# Patient Record
Sex: FEMALE | Race: WHITE | Employment: FULL TIME
[De-identification: names, ages, dates, MRNs, and addresses within clinical notes are randomized per-mention and may not be internally consistent; named-entity substitution may affect disease eponyms.]

---

## 2017-07-08 ENCOUNTER — HEALTH MAINTENANCE LETTER (OUTPATIENT)
Age: 32
End: 2017-07-08

## 2018-07-15 ENCOUNTER — HEALTH MAINTENANCE LETTER (OUTPATIENT)
Age: 33
End: 2018-07-15

## 2019-07-21 ENCOUNTER — NURSE TRIAGE (OUTPATIENT)
Dept: NURSING | Facility: CLINIC | Age: 34
End: 2019-07-21

## 2019-07-21 ENCOUNTER — HOSPITAL ENCOUNTER (EMERGENCY)
Facility: CLINIC | Age: 34
Discharge: HOME | End: 2019-07-21
Attending: EMERGENCY MEDICINE | Admitting: EMERGENCY MEDICINE
Payer: COMMERCIAL

## 2019-07-21 VITALS
HEIGHT: 68 IN | DIASTOLIC BLOOD PRESSURE: 71 MMHG | WEIGHT: 125 LBS | TEMPERATURE: 98.2 F | BODY MASS INDEX: 18.94 KG/M2 | RESPIRATION RATE: 16 BRPM | OXYGEN SATURATION: 100 % | SYSTOLIC BLOOD PRESSURE: 117 MMHG

## 2019-07-21 DIAGNOSIS — K59.00 CONSTIPATION, UNSPECIFIED CONSTIPATION TYPE: ICD-10-CM

## 2019-07-21 PROCEDURE — 99283 EMERGENCY DEPT VISIT LOW MDM: CPT | Performed by: EMERGENCY MEDICINE

## 2019-07-21 PROCEDURE — 99284 EMERGENCY DEPT VISIT MOD MDM: CPT | Mod: Z6 | Performed by: EMERGENCY MEDICINE

## 2019-07-21 PROCEDURE — 25000132 ZZH RX MED GY IP 250 OP 250 PS 637: Performed by: EMERGENCY MEDICINE

## 2019-07-21 RX ORDER — MAGNESIUM CARB/ALUMINUM HYDROX 105-160MG
296 TABLET,CHEWABLE ORAL ONCE
Status: COMPLETED | OUTPATIENT
Start: 2019-07-21 | End: 2019-07-21

## 2019-07-21 RX ADMIN — MAGNESIUM CITRATE 296 ML: 1.75 LIQUID ORAL at 16:49

## 2019-07-21 ASSESSMENT — ENCOUNTER SYMPTOMS
BLOOD IN STOOL: 0
CONSTITUTIONAL NEGATIVE: 1
ANAL BLEEDING: 0
CONSTIPATION: 1
NAUSEA: 1
ABDOMINAL PAIN: 1
VOMITING: 0

## 2019-07-21 ASSESSMENT — MIFFLIN-ST. JEOR: SCORE: 1310.5

## 2019-07-21 NOTE — ED PROVIDER NOTES
History     Chief Complaint   Patient presents with     Constipation     9 days     HPI  Ritika Soler is a 33 year old female with hx of constipation with presentation for constipation refractory to enemas x 2, miralax, flax seed and herbal remedy her mother gave her. Last BM was 9 days ago. Feels like food is floating up in her chest and won't go down. Decreased appetite and nausea. No vomiting. Mild abd tenderness only.    Allergies:  No Known Allergies    Problem List:    Patient Active Problem List    Diagnosis Date Noted     UTI (urinary tract infection) 02/10/2014     Priority: Medium     ASCUS, + HPV, LEEP 02/16/2009     Priority: Medium     1/09:Atypical endocervical cells on pap at [facility] in [place].  2/16/09:Colpo--B9. Repeat pap in 6 months.  2/16/2010:pap--NIL. Repeat pap 1 year.  5/5/11: Pap - ASCUS, + HPV 53. Plan colp.  6/9/11:[place]--TERESE 1-2. Plan LEEP.   6/22/11:LEEP--Mild and moderate dysplasia, possible extension to margin. Repeat pap 6 months. Reminder in epic.   1/24/12:Pap--NIL. Repeat pap in 6 months. Reminder sent.  **Annual pap screening indicated due to possible +Margins on LEEP**  1/16/13: Patient failed follow-up. Pap tracking file closed.   2/6/14: NIL pap, neg HPV. Plan cotest pap & HPV in 1 year.  3/2/16:Pap--NIL, neg HPV. Continue annual pap screening (given possible +Margins on previous LEEP) -- unless otherwise directed by Provider.              Past Medical History:    Past Medical History:   Diagnosis Date     History of colposcopy with cervical biopsy 6/9/11     S/P LEEP of cervix 2011       Past Surgical History:    Past Surgical History:   Procedure Laterality Date     COLPOSCOPY CERVIX, LOOP ELECTRODE BIOPSY, COMBINED  6/22/11    mild and moderate dysplasia       Family History:    Family History   Problem Relation Age of Onset     Cancer Maternal Grandmother      Heart Disease Paternal Grandfather      Cancer Paternal Grandfather         stomach cancer      "Asthma Brother      Cancer Mother         skin cancer     Cancer Maternal Grandfather        Social History:  Marital Status:  Single [1]  Social History     Tobacco Use     Smoking status: Never Smoker     Smokeless tobacco: Never Used   Substance Use Topics     Alcohol use: Yes     Comment: Avg. 3 drinks per week     Drug use: No        Medications:      etonogestrel (NEXPLANON) 68 MG IMPL   IBUPROFEN 200 MG OR CAPS   MULTI-VITAMIN OR TABS       Review of Systems   Constitutional: Negative.    Gastrointestinal: Positive for abdominal pain (cramping 3-4 days ago), constipation and nausea. Negative for anal bleeding, blood in stool and vomiting.   Skin: Negative.          Physical Exam   BP: 117/71  Heart Rate: 58  Temp: 98.2  F (36.8  C)  Resp: 16  Height: 172.7 cm (5' 8\")  Weight: 56.7 kg (125 lb)  SpO2: 100 %      Physical Exam   Constitutional: She is oriented to person, place, and time. She appears well-developed and well-nourished. No distress.   HENT:   Head: Normocephalic and atraumatic.   Mouth/Throat: Oropharynx is clear and moist.   Eyes: Conjunctivae and EOM are normal. No scleral icterus.   Neck: Normal range of motion. Neck supple. No tracheal deviation present.   Cardiovascular: Normal rate, regular rhythm and normal heart sounds. Exam reveals no gallop and no friction rub.   No murmur heard.  Pulmonary/Chest: Effort normal and breath sounds normal. No respiratory distress. She has no wheezes. She has no rales.   Abdominal: Soft. Bowel sounds are normal. She exhibits no distension. There is no tenderness.   Musculoskeletal: Normal range of motion. She exhibits no edema or tenderness.   Neurological: She is alert and oriented to person, place, and time. Coordination normal.   Skin: Skin is warm and dry. No rash noted. She is not diaphoretic. No erythema. No pallor.   Psychiatric: She has a normal mood and affect. Her behavior is normal.   Nursing note and vitals reviewed.      ED Course      "   Procedures                 No results found for this or any previous visit (from the past 24 hour(s)).    Medications   magnesium citrate solution 296 mL (296 mLs Oral Given 7/21/19 6296)       Assessments & Plan (with Medical Decision Making)   Slow transit constipation with no s/sx of obstruction and benign abd. discharge with Magnesium citare and instructions for supportive care. She will return as needed for worsened condition or worsening symptoms, or new problems or concerns.      I have reviewed the nursing notes.    I have reviewed the findings, diagnosis, plan and need for follow up with the patient.       Medication List      Magnesium citrate 1 bottle po.         Final diagnoses:   Constipation, unspecified constipation type       7/21/2019   [facility] EMERGENCY DEPARTMENT     Judah Post MD  07/26/19 7122

## 2019-07-21 NOTE — TELEPHONE ENCOUNTER
Mother is caller; patient is in the shower; requesting information on treating constipation   advised that patient call FNA back for a full triage to determine appropriate   Mother  will comply   Yen Masterson RN  FNA'       Additional Information    Caller requesting information not related to medicine    [1] Caller is not with the adult (patient) AND [2] reporting urgent symptoms     Not a urgent situation; Advised that adult call FNA herself    Protocols used: MEDICATION QUESTION CALL-A-AH, INFORMATION ONLY CALL-A-AH

## 2019-07-21 NOTE — ED NOTES
Pt reports she has had some constipation, last BM was possible on Friday the 12th, pt unsure. Pt reports her BM get all messed up when she flies. Pt flew out on Sunday the 14th. Pt reports she has tried everything from coffee, miralax, flaxseed, enema X2 yesterday with no relief. Pt reports having a poor appetite, feeling nauseated but has not had any vomiting. Pt reports having some mild tenderness to abdomen.

## 2019-07-21 NOTE — DISCHARGE INSTRUCTIONS
Treatment of constipation:    Increase fiber in diet (see handout)  Fiber supplement daily  Milk of Magnesia daily  Miralax once daily  Colace (stool softener)   Dulcolax or Senokot if needed (laxative pills or suppositories)  Magnesium Citrate one bottle if needed for severe constipation  Fleets Enema if needed for severe constipation    These are all available over the counter without prescription. And you may use more than one and use them in combination.

## 2019-11-03 ENCOUNTER — HEALTH MAINTENANCE LETTER (OUTPATIENT)
Age: 34
End: 2019-11-03

## 2020-11-16 ENCOUNTER — HEALTH MAINTENANCE LETTER (OUTPATIENT)
Age: 35
End: 2020-11-16

## 2021-09-12 ENCOUNTER — HEALTH MAINTENANCE LETTER (OUTPATIENT)
Age: 36
End: 2021-09-12

## 2022-01-02 ENCOUNTER — HEALTH MAINTENANCE LETTER (OUTPATIENT)
Age: 37
End: 2022-01-02

## 2022-11-19 ENCOUNTER — HEALTH MAINTENANCE LETTER (OUTPATIENT)
Age: 37
End: 2022-11-19

## 2023-01-04 ENCOUNTER — OFFICE VISIT (OUTPATIENT)
Dept: OBGYN | Facility: CLINIC | Age: 38
End: 2023-01-04
Payer: COMMERCIAL

## 2023-01-04 VITALS
DIASTOLIC BLOOD PRESSURE: 77 MMHG | HEIGHT: 68 IN | RESPIRATION RATE: 16 BRPM | BODY MASS INDEX: 20 KG/M2 | HEART RATE: 60 BPM | WEIGHT: 132 LBS | TEMPERATURE: 98.1 F | SYSTOLIC BLOOD PRESSURE: 112 MMHG

## 2023-01-04 DIAGNOSIS — N94.6 DYSMENORRHEA: ICD-10-CM

## 2023-01-04 DIAGNOSIS — Z23 NEED FOR PROPHYLACTIC VACCINATION AND INOCULATION AGAINST INFLUENZA: Primary | ICD-10-CM

## 2023-01-04 DIAGNOSIS — Z12.4 CERVICAL CANCER SCREENING: ICD-10-CM

## 2023-01-04 DIAGNOSIS — Z01.419 ENCOUNTER FOR ANNUAL ROUTINE GYNECOLOGICAL EXAMINATION: ICD-10-CM

## 2023-01-04 LAB
CHOLEST SERPL-MCNC: 184 MG/DL
HBA1C MFR BLD: 5.2 % (ref 0–5.6)
HDLC SERPL-MCNC: 88 MG/DL
LDLC SERPL CALC-MCNC: 83 MG/DL
NONHDLC SERPL-MCNC: 96 MG/DL
TRIGL SERPL-MCNC: 63 MG/DL
TSH SERPL DL<=0.005 MIU/L-ACNC: 3.19 UIU/ML (ref 0.3–4.2)

## 2023-01-04 PROCEDURE — 84443 ASSAY THYROID STIM HORMONE: CPT | Performed by: OBSTETRICS & GYNECOLOGY

## 2023-01-04 PROCEDURE — 90471 IMMUNIZATION ADMIN: CPT | Performed by: OBSTETRICS & GYNECOLOGY

## 2023-01-04 PROCEDURE — 90686 IIV4 VACC NO PRSV 0.5 ML IM: CPT | Performed by: OBSTETRICS & GYNECOLOGY

## 2023-01-04 PROCEDURE — 99385 PREV VISIT NEW AGE 18-39: CPT | Mod: 25 | Performed by: OBSTETRICS & GYNECOLOGY

## 2023-01-04 PROCEDURE — 87624 HPV HI-RISK TYP POOLED RSLT: CPT | Performed by: OBSTETRICS & GYNECOLOGY

## 2023-01-04 PROCEDURE — 36415 COLL VENOUS BLD VENIPUNCTURE: CPT | Performed by: OBSTETRICS & GYNECOLOGY

## 2023-01-04 PROCEDURE — 99213 OFFICE O/P EST LOW 20 MIN: CPT | Mod: 25 | Performed by: OBSTETRICS & GYNECOLOGY

## 2023-01-04 PROCEDURE — 80061 LIPID PANEL: CPT | Performed by: OBSTETRICS & GYNECOLOGY

## 2023-01-04 PROCEDURE — 83036 HEMOGLOBIN GLYCOSYLATED A1C: CPT | Performed by: OBSTETRICS & GYNECOLOGY

## 2023-01-04 PROCEDURE — G0145 SCR C/V CYTO,THINLAYER,RESCR: HCPCS | Performed by: OBSTETRICS & GYNECOLOGY

## 2023-01-04 RX ORDER — METHYLPHENIDATE HYDROCHLORIDE 20 MG/1
1 TABLET ORAL
COMMUNITY
Start: 2022-02-11

## 2023-01-04 RX ORDER — VALACYCLOVIR HYDROCHLORIDE 1 G/1
1000 TABLET, FILM COATED ORAL
COMMUNITY

## 2023-01-04 RX ORDER — DEXMETHYLPHENIDATE HYDROCHLORIDE 20 MG/1
CAPSULE, EXTENDED RELEASE ORAL
COMMUNITY
Start: 2022-12-06

## 2023-01-04 RX ORDER — QUETIAPINE FUMARATE 100 MG/1
TABLET, FILM COATED ORAL
COMMUNITY
Start: 2022-12-06

## 2023-01-04 RX ORDER — DEXMETHYLPHENIDATE HYDROCHLORIDE 10 MG/1
TABLET ORAL
COMMUNITY
Start: 2022-12-06

## 2023-01-04 NOTE — PROGRESS NOTES
SUBJECTIVE:   CC: Ritika Soler is an 37 year old woman who presents for preventive health visit.   Not sexually active.   Has previously used tramadol for cramps. Didn't get a menses while she was exposed to hazardous chemicals. Stopped using tramadol for her cramps as this would cause her trouble for depolyment.   Desires pregnancy, but not partnered.   Periods come every month, sometimes 5 weeks, according to shanthi. Bleeds for 3-4 days. Bleeds heavy for 1-2 days, changes pad every 3x day. Intense cramping. Has to miss work. Gets back cramping. Gets lower back pain if she needs to poop or pee. Has herpes. No other STIs. No dyspareunia.     Patient has been advised of split billing requirements and indicates understanding: Yes     Healthy Habits:    Do you get at least three servings of calcium containing foods daily (dairy, green leafy vegetables, etc.)? no, taking calcium and/or vitamin D supplement: no - needs to be on Ca+ vit D    Amount of exercise or daily activities, outside of work: 3 day(s) per week    Problems taking medications regularly No    Medication side effects: No    Have you had an eye exam in the past two years? yes    Do you see a dentist twice per year? no    Do you have sleep apnea, excessive snoring or daytime drowsiness?yes - has insomnia which she feels is due to exposure to toxic chemicals. Is working with a holistic person to pitt herself of these toxins.       -------------------------------------    Today's PHQ-2 Score:   PHQ-2 ( 1999 [employer]) 1/4/2023 3/2/2016   Q1: Little interest or pleasure in doing things 0 0   Q2: Feeling down, depressed or hopeless 0 0   PHQ-2 Score 0 0       Abuse: Current or Past(Physical, Sexual or Emotional)- No  Do you feel safe in your environment? Yes    Have you ever done Advance Care Planning? (For example, a Health Directive, POLST, or a discussion with a medical provider or your loved ones about your wishes): No, advance care planning information  given to patient to review.  Patient plans to discuss their wishes with loved ones or provider.      Social History     Tobacco Use     Smoking status: Never     Smokeless tobacco: Never   Substance Use Topics     Alcohol use: Yes     Comment: Avg. 3 drinks per week     If you drink alcohol do you typically have >3 drinks per day or >7 drinks per week? No                     Reviewed orders with patient.  Reviewed health maintenance and updated orders accordingly - Yes  Lab work is in process    FHS-7: No flowsheet data found.  Mammogram Screening - Offered annual screening and updated Health Maintenance for mutual plan based on risk factor consideration    Pertinent mammograms are reviewed under the imaging tab.    Pertinent mammograms are reviewed under the imaging tab.  History of abnormal Pap smear: YES - other categories - see link Cervical Cytology Screening Guidelines  PAP / HPV Latest Ref Rng & Units 3/2/2016 2014 2012   PAP (Historical) - NIL NIL NIL   HPV16 NEG Negative - -   HPV18 NEG Negative - -   HRHPV NEG Negative - -     Reviewed and updated as needed this visit by clinical staff   Tobacco  Allergies  Meds   Med Hx  Surg Hx  Fam Hx  Soc Hx        Reviewed and updated as needed this visit by Provider                 Past Medical History:   Diagnosis Date     History of colposcopy with cervical biopsy 11    TERESE 1-2     S/P LEEP of cervix     Mild-Moderate Dysplasia      Past Surgical History:   Procedure Laterality Date     COLPOSCOPY CERVIX, LOOP ELECTRODE BIOPSY, COMBINED  11    mild and moderate dysplasia     OB History    Para Term  AB Living   0 0 0 0 0 0   SAB IAB Ectopic Multiple Live Births   0 0 0 0 0       ROS:  CONSTITUTIONAL: NEGATIVE for fever, chills, change in weight  INTEGUMENTARU/SKIN: NEGATIVE for worrisome rashes, moles or lesions  EYES: NEGATIVE for vision changes or irritation  ENT: NEGATIVE for ear, mouth and throat problems  RESP:  "NEGATIVE for significant cough or SOB  BREAST: NEGATIVE for masses, tenderness or discharge  CV: NEGATIVE for chest pain, palpitations or peripheral edema  GI: NEGATIVE for nausea, abdominal pain, heartburn, or change in bowel habits  : NEGATIVE for unusual urinary or vaginal symptoms. Periods are regular. +dysmenorrhea   MUSCULOSKELETAL: NEGATIVE for significant arthralgias or myalgia  NEURO: NEGATIVE for weakness, dizziness or paresthesias  PSYCHIATRIC: NEGATIVE for changes in mood or affect, +insomnia     OBJECTIVE:   /77 (BP Location: Left arm, Patient Position: Chair, Cuff Size: Adult Regular)   Pulse 60   Temp 98.1  F (36.7  C) (Tympanic)   Resp 16   Ht 1.727 m (5' 8\")   Wt 59.9 kg (132 lb)   LMP 12/30/2022   BMI 20.07 kg/m    EXAM:  GENERAL: healthy, alert and no distress  EYES: Eyes grossly normal to inspection  RESP: breathing comfortably on room air with no appreciable cough or wheeze  BREAST: normal without masses, tenderness or nipple discharge and no palpable axillary masses or adenopathy  CV: regular rate, warm and well-perfused, normatensive   ABDOMEN: soft, nontender, no hepatosplenomegaly, no masses and bowel sounds normal   (female): normal female external genitalia, normal urethral meatus, vaginal mucosa pink, moist, well rugated, and normal cervix/adnexa/uterus without masses or discharge, no tenderness with exam  MS: no gross musculoskeletal defects noted, no edema  SKIN: no suspicious lesions or rashes  NEURO: Normal strength and tone, mentation intact and speech normal  PSYCH: mentation appears normal, affect normal/bright    Diagnostic Test Results:  Labs reviewed in Epic    ASSESSMENT/PLAN:   (Z23) Need for prophylactic vaccination and inoculation against influenza  (primary encounter diagnosis)  Comment:  Plan: INFLUENZA VACCINE IM > 6 MONTHS VALENT IIV4         (AFLURIA/FLUZONE)           (Z12.4) Cervical cancer screening  Comment:   Plan: Pap screen with HPV - recommended " "age 30 - 65         years            (Z01.419) Encounter for annual routine gynecological examination  Comment:   Plan: *MA Screening Digital Bilateral, Hemoglobin         A1c, Lipid panel reflex to direct LDL Fasting,         TSH with free T4 reflex            (N94.6) Dysmenorrhea  Comment:   Plan: US Pelvic Complete with Transvaginal   Recommended pelvic US for structural cause of her pain. Otherwise ddx is endometriosis or primary dysmenorrhea.  Discussed medical treatment options including depo provera, OCPs, hormonal patch and hormonal ring including side effect profile and bleeding patterns. I discussed LARC options of Mirena IUD, nexplanon or depo provera. Will complete eval and go from there.     Patient has been advised of split billing requirements and indicates understanding: Yes  COUNSELING:   Reviewed preventive health counseling, as reflected in patient instructions  Special attention given to:        declined STI testing    Estimated body mass index is 20.07 kg/m  as calculated from the following:    Height as of this encounter: 1.727 m (5' 8\").    Weight as of this encounter: 59.9 kg (132 lb).        She reports that she has never smoked. She has never used smokeless tobacco.      Counseling Resources:  ATP IV Guidelines  Pooled Cohorts Equation Calculator  Breast Cancer Risk Calculator  BRCA-Related Cancer Risk Assessment: FHS-7 Tool  FRAX Risk Assessment  ICSI Preventive Guidelines  Dietary Guidelines for Americans, 2010  USDA's MyPlate  ASA Prophylaxis  Lung CA Screening    Devika Villatoro MD  [facility] [place]    "

## 2023-01-04 NOTE — NURSING NOTE
"Initial /77 (BP Location: Left arm, Patient Position: Chair, Cuff Size: Adult Regular)   Pulse 60   Temp 98.1  F (36.7  C) (Tympanic)   Resp 16   Ht 1.727 m (5' 8\")   Wt 59.9 kg (132 lb)   LMP 12/30/2022   BMI 20.07 kg/m   Estimated body mass index is 20.07 kg/m  as calculated from the following:    Height as of this encounter: 1.727 m (5' 8\").    Weight as of this encounter: 59.9 kg (132 lb). .    Gypsy Garcia, [facility]    "

## 2023-01-05 ENCOUNTER — HOSPITAL ENCOUNTER (OUTPATIENT)
Dept: ULTRASOUND IMAGING | Facility: CLINIC | Age: 38
Discharge: HOME | End: 2023-01-05
Attending: OBSTETRICS & GYNECOLOGY | Admitting: OBSTETRICS & GYNECOLOGY
Payer: COMMERCIAL

## 2023-01-05 ENCOUNTER — HOSPITAL ENCOUNTER (OUTPATIENT)
Dept: MAMMOGRAPHY | Facility: CLINIC | Age: 38
Discharge: HOME | End: 2023-01-05
Attending: OBSTETRICS & GYNECOLOGY | Admitting: OBSTETRICS & GYNECOLOGY
Payer: COMMERCIAL

## 2023-01-05 DIAGNOSIS — N94.6 DYSMENORRHEA: ICD-10-CM

## 2023-01-05 DIAGNOSIS — Z01.419 ENCOUNTER FOR ANNUAL ROUTINE GYNECOLOGICAL EXAMINATION: ICD-10-CM

## 2023-01-05 PROCEDURE — 77063 BREAST TOMOSYNTHESIS BI: CPT

## 2023-01-05 PROCEDURE — 76856 US EXAM PELVIC COMPLETE: CPT

## 2023-01-06 LAB
BKR LAB AP GYN ADEQUACY: NORMAL
BKR LAB AP GYN INTERPRETATION: NORMAL
BKR LAB AP HPV REFLEX: NORMAL
BKR LAB AP PREVIOUS ABNORMAL: NORMAL
PATH REPORT.COMMENTS IMP SPEC: NORMAL
PATH REPORT.COMMENTS IMP SPEC: NORMAL
PATH REPORT.RELEVANT HX SPEC: NORMAL

## 2023-01-09 LAB
HUMAN PAPILLOMA VIRUS 16 DNA: NEGATIVE
HUMAN PAPILLOMA VIRUS 18 DNA: NEGATIVE
HUMAN PAPILLOMA VIRUS FINAL DIAGNOSIS: NORMAL
HUMAN PAPILLOMA VIRUS OTHER HR: NEGATIVE

## 2023-01-11 ENCOUNTER — HOSPITAL ENCOUNTER (OUTPATIENT)
Dept: RESPIRATORY THERAPY | Facility: CLINIC | Age: 38
Discharge: HOME | End: 2023-01-11
Attending: INTERNAL MEDICINE | Admitting: INTERNAL MEDICINE
Payer: COMMERCIAL

## 2023-01-11 LAB
DLCOUNC-%PRED-PRE: 95 %
DLCOUNC-PRE: 23.96 ML/MIN/MMHG
DLCOUNC-PRED: 25.03 ML/MIN/MMHG
ERV-%PRED-PRE: 63 %
ERV-PRE: 0.92 L
ERV-PRED: 1.45 L
EXPTIME-PRE: 6.63 SEC
FEF2575-%PRED-POST: 94 %
FEF2575-%PRED-PRE: 72 %
FEF2575-POST: 3.3 L/SEC
FEF2575-PRE: 2.55 L/SEC
FEF2575-PRED: 3.51 L/SEC
FEFMAX-%PRED-PRE: 77 %
FEFMAX-PRE: 5.9 L/SEC
FEFMAX-PRED: 7.62 L/SEC
FEV1-%PRED-PRE: 91 %
FEV1-PRE: 3.18 L
FEV1FEV6-PRE: 74 %
FEV1FEV6-PRED: 84 %
FEV1FVC-PRE: 74 %
FEV1FVC-PRED: 82 %
FEV1SVC-PRE: 82 %
FEV1SVC-PRED: 82 %
FIFMAX-PRE: 4.66 L/SEC
FRCPLETH-%PRED-PRE: 132 %
FRCPLETH-PRE: 3.84 L
FRCPLETH-PRED: 2.91 L
FVC-%PRED-PRE: 101 %
FVC-PRE: 4.3 L
FVC-PRED: 4.24 L
GAW-%PRED-PRE: 87 %
GAW-PRE: 0.9 L/S/CMH2O
GAW-PRED: 1.03 L/S/CMH2O
IC-%PRED-PRE: 103 %
IC-PRE: 2.86 L
IC-PRED: 2.77 L
RVPLETH-%PRED-PRE: 160 %
RVPLETH-PRE: 2.82 L
RVPLETH-PRED: 1.75 L
SGAW-%PRED-PRE: 218 %
SGAW-PRE: 0.22 1/CMH2O*S
SGAW-PRED: 0.1 1/CMH2O*S
SRAW-%PRED-PRE: 94 %
SRAW-PRE: 4.5 CMH2O*S
SRAW-PRED: 4.76 CMH2O*S
TLCPLETH-%PRED-PRE: 119 %
TLCPLETH-PRE: 6.7 L
TLCPLETH-PRED: 5.61 L
VA-%PRED-PRE: 104 %
VA-PRE: 6.05 L
VC-%PRED-PRE: 92 %
VC-PRE: 3.89 L
VC-PRED: 4.22 L

## 2023-01-11 PROCEDURE — 94060 EVALUATION OF WHEEZING: CPT

## 2023-01-11 PROCEDURE — 94729 DIFFUSING CAPACITY: CPT

## 2023-01-11 PROCEDURE — 250N000009 HC RX 250

## 2023-01-11 PROCEDURE — 94726 PLETHYSMOGRAPHY LUNG VOLUMES: CPT

## 2023-01-11 RX ORDER — ALBUTEROL SULFATE 0.83 MG/ML
2.5 SOLUTION RESPIRATORY (INHALATION) ONCE
Status: COMPLETED | OUTPATIENT
Start: 2023-01-11 | End: 2023-01-11

## 2023-01-11 RX ADMIN — ALBUTEROL SULFATE 2.5 MG: 2.5 SOLUTION RESPIRATORY (INHALATION) at 08:43

## 2023-04-28 NOTE — ED AVS SNAPSHOT
[facility] Emergency Department  [address] [address]  Phone:  [phone]  Fax:  [phone]                                    Ritika Soler   MRN: [id number]    Department:  [facility] Emergency Department   Date of Visit:  7/21/2019           After Visit Summary Signature Page    I have received my discharge instructions, and my questions have been answered. I have discussed any challenges I see with this plan with the nurse or doctor.    ..........................................................................................................................................  Patient/Patient Representative Signature      ..........................................................................................................................................  Patient Representative Print Name and Relationship to Patient    ..................................................               ................................................  Date                                   Time    ..........................................................................................................................................  Reviewed by Signature/Title    ...................................................              ..............................................  Date                                               Time          22EPIC Rev 08/18        General

## 2024-04-07 ENCOUNTER — HEALTH MAINTENANCE LETTER (OUTPATIENT)
Age: 39
End: 2024-04-07

## 2025-02-26 ENCOUNTER — OFFICE VISIT (OUTPATIENT)
Dept: OBGYN | Facility: CLINIC | Age: 40
End: 2025-02-26
Payer: COMMERCIAL

## 2025-02-26 VITALS
HEIGHT: 68 IN | HEART RATE: 55 BPM | RESPIRATION RATE: 18 BRPM | DIASTOLIC BLOOD PRESSURE: 80 MMHG | WEIGHT: 141 LBS | TEMPERATURE: 98.6 F | SYSTOLIC BLOOD PRESSURE: 118 MMHG | BODY MASS INDEX: 21.37 KG/M2

## 2025-02-26 DIAGNOSIS — Z12.31 ENCOUNTER FOR SCREENING MAMMOGRAM FOR BREAST CANCER: ICD-10-CM

## 2025-02-26 DIAGNOSIS — Z13.29 SCREENING FOR THYROID DISORDER: ICD-10-CM

## 2025-02-26 DIAGNOSIS — Z13.0 SCREENING, ANEMIA, DEFICIENCY, IRON: ICD-10-CM

## 2025-02-26 DIAGNOSIS — Z13.1 SCREENING FOR DIABETES MELLITUS: ICD-10-CM

## 2025-02-26 DIAGNOSIS — Z01.419 WOMEN'S ANNUAL ROUTINE GYNECOLOGICAL EXAMINATION: Primary | ICD-10-CM

## 2025-02-26 LAB
ALBUMIN SERPL BCG-MCNC: 4.6 G/DL (ref 3.5–5.2)
ALP SERPL-CCNC: 62 U/L (ref 40–150)
ALT SERPL W P-5'-P-CCNC: 17 U/L (ref 0–50)
ANION GAP SERPL CALCULATED.3IONS-SCNC: 8 MMOL/L (ref 7–15)
AST SERPL W P-5'-P-CCNC: 27 U/L (ref 0–45)
BILIRUB SERPL-MCNC: 1.1 MG/DL
BUN SERPL-MCNC: 12.3 MG/DL (ref 6–20)
CALCIUM SERPL-MCNC: 9.7 MG/DL (ref 8.8–10.4)
CHLORIDE SERPL-SCNC: 104 MMOL/L (ref 98–107)
CREAT SERPL-MCNC: 0.89 MG/DL (ref 0.51–0.95)
EGFRCR SERPLBLD CKD-EPI 2021: 83 ML/MIN/1.73M2
ERYTHROCYTE [DISTWIDTH] IN BLOOD BY AUTOMATED COUNT: 11.8 % (ref 10–15)
EST. AVERAGE GLUCOSE BLD GHB EST-MCNC: 97 MG/DL
GLUCOSE SERPL-MCNC: 87 MG/DL (ref 70–99)
HBA1C MFR BLD: 5 % (ref 0–5.6)
HCO3 SERPL-SCNC: 29 MMOL/L (ref 22–29)
HCT VFR BLD AUTO: 36.3 % (ref 35–47)
HGB BLD-MCNC: 12.1 G/DL (ref 11.7–15.7)
MCH RBC QN AUTO: 31.3 PG (ref 26.5–33)
MCHC RBC AUTO-ENTMCNC: 33.3 G/DL (ref 31.5–36.5)
MCV RBC AUTO: 94 FL (ref 78–100)
PLATELET # BLD AUTO: 369 10E3/UL (ref 150–450)
POTASSIUM SERPL-SCNC: 4.5 MMOL/L (ref 3.4–5.3)
PROT SERPL-MCNC: 7 G/DL (ref 6.4–8.3)
RBC # BLD AUTO: 3.86 10E6/UL (ref 3.8–5.2)
SODIUM SERPL-SCNC: 141 MMOL/L (ref 135–145)
TSH SERPL DL<=0.005 MIU/L-ACNC: 3.58 UIU/ML (ref 0.3–4.2)
WBC # BLD AUTO: 5.8 10E3/UL (ref 4–11)

## 2025-02-26 PROCEDURE — 36415 COLL VENOUS BLD VENIPUNCTURE: CPT | Performed by: PHYSICIAN ASSISTANT

## 2025-02-26 PROCEDURE — 99459 PELVIC EXAMINATION: CPT | Performed by: PHYSICIAN ASSISTANT

## 2025-02-26 PROCEDURE — 83036 HEMOGLOBIN GLYCOSYLATED A1C: CPT | Performed by: PHYSICIAN ASSISTANT

## 2025-02-26 PROCEDURE — 99396 PREV VISIT EST AGE 40-64: CPT | Performed by: PHYSICIAN ASSISTANT

## 2025-02-26 PROCEDURE — 3074F SYST BP LT 130 MM HG: CPT | Performed by: PHYSICIAN ASSISTANT

## 2025-02-26 PROCEDURE — 80053 COMPREHEN METABOLIC PANEL: CPT | Performed by: PHYSICIAN ASSISTANT

## 2025-02-26 PROCEDURE — 84443 ASSAY THYROID STIM HORMONE: CPT | Performed by: PHYSICIAN ASSISTANT

## 2025-02-26 PROCEDURE — 3079F DIAST BP 80-89 MM HG: CPT | Performed by: PHYSICIAN ASSISTANT

## 2025-02-26 PROCEDURE — 85027 COMPLETE CBC AUTOMATED: CPT | Performed by: PHYSICIAN ASSISTANT

## 2025-02-26 NOTE — LETTER
2025    Ritika Soler   1985        To Whom it May Concern;    I have seen and examined Ritika Soler regarding her chronic menstrual cramps. She is stable and well controlled in her treatment of menstrual cramps and there is no need for prescription or medical management at this time. She is to continue over the counter ibuprofen and heat as needed.   No work restrictions due to her chronic menstrual cramps and no impact to her work. She is able to perform all work related tasks.       Sincerely,        Kayleigh Orona PA-C

## 2025-02-26 NOTE — PROGRESS NOTES
.   SUBJECTIVE:   CC: Ritika Soler is an 39 year old woman who presents for preventive health visit.       Patient has been advised of split billing requirements and indicates understanding: Yes  Healthy Habits:  Do you get at least three servings of calcium containing foods daily (dairy, green leafy vegetables, etc.)? yes  Amount of exercise or daily activities, outside of work: 3 day(s) per week  Problems taking medications regularly No  Medication side effects: No  Have you had an eye exam in the past two years? no  Do you see a dentist twice per year? yes  Do you have sleep apnea, excessive snoring or daytime drowsiness?no    Here for annual exam and needs a note for workability due to chronic menstrual cramps.     She has no menstrual concerns and states no concerns for STI.   She states that her menstrual cramps are well managed with over the counter ibuprofen and heat.   Screening mammogram yearly    Today's PHQ-2 Score:       2/26/2025     2:17 PM 1/4/2023     9:06 AM   PHQ-2 ( 1999 [employer])   Q1: Little interest or pleasure in doing things 0 0   Q2: Feeling down, depressed or hopeless 0 0   PHQ-2 Score 0 0       Abuse: Current or Past(Physical, Sexual or Emotional)- No  Do you feel safe in your environment? Yes        Social History     Tobacco Use    Smoking status: Never    Smokeless tobacco: Never   Substance Use Topics    Alcohol use: Yes     Comment: Avg. 3 drinks per week     If you drink alcohol do you typically have >3 drinks per day or >7 drinks per week? No                     Reviewed orders with patient.  Reviewed health maintenance and updated orders accordingly - Yes  BP Readings from Last 3 Encounters:   02/26/25 118/80   01/04/23 112/77   07/21/19 117/71    Wt Readings from Last 3 Encounters:   02/26/25 64 kg (141 lb)   01/04/23 59.9 kg (132 lb)   07/21/19 56.7 kg (125 lb)                  Patient Active Problem List   Diagnosis    ASCUS, + HPV, LEEP    UTI (urinary tract infection)      Past Surgical History:   Procedure Laterality Date    BIOPSY BREAST Left     COLPOSCOPY CERVIX, LOOP ELECTRODE BIOPSY, COMBINED  06/22/2011    mild and moderate dysplasia    MAMMOPLASTY AUGMENTATION      2011       Social History     Tobacco Use    Smoking status: Never    Smokeless tobacco: Never   Substance Use Topics    Alcohol use: Yes     Comment: Avg. 3 drinks per week     Family History   Problem Relation Age of Onset    Cancer Mother         skin cancer    Cancer Maternal Grandmother     Pancreatic Cancer Maternal Grandmother     Cancer Maternal Grandfather     Heart Disease Paternal Grandfather     Cancer Paternal Grandfather         stomach cancer    Asthma Brother          Current Outpatient Medications   Medication Sig Dispense Refill    IBUPROFEN 200 MG OR CAPS Take by mouth.      MULTI-VITAMIN OR TABS Take by mouth.      valACYclovir (VALTREX) 1000 mg tablet Take 1,000 mg by mouth.      dexmethylphenidate (FOCALIN XR) 20 MG 24 hr capsule  (Patient not taking: Reported on 2/26/2025)      dexmethylphenidate (FOCALIN) 10 MG tablet  (Patient not taking: Reported on 2/26/2025)      Fluticasone Furoate-Vilanterol (BREO ELLIPTA IN)  (Patient not taking: Reported on 2/26/2025)      methylphenidate (RITALIN) 20 MG tablet Take 1 tablet by mouth 3 times daily (Patient not taking: Reported on 2/26/2025)      QUEtiapine (SEROQUEL) 100 MG tablet  (Patient not taking: Reported on 2/26/2025)       No Known Allergies  Recent Labs   Lab Test 01/04/23  1014   A1C 5.2   LDL 83   HDL 88   TRIG 63   TSH 3.19        FHS-7:       1/5/2023     8:08 AM   Breast CA Risk Assessment (FHS-7)   Did any of your first-degree relatives have breast or ovarian cancer? No   Did any of your relatives have bilateral breast cancer? No   Did any man in your family have breast cancer? No   Did any woman in your family have breast and ovarian cancer? No   Did any woman in your family have breast cancer before age 50 y? No   Do you have 2 or  more relatives with breast and/or ovarian cancer? No   Do you have 2 or more relatives with breast and/or bowel cancer? No     click delete button to remove this line now  Mammogram screening yearly   Pertinent mammograms are reviewed under the imaging tab.    Pertinent mammograms are reviewed under the imaging tab.  History of abnormal Pap smear: YES - history of ASCUS, +HPV, LEEP in the past      Latest Ref Rng & Units 2023     8:55 AM 3/2/2016    11:57 AM 3/2/2016    12:00 AM   PAP / HPV   PAP  Negative for Intraepithelial Lesion or Malignancy (NILM)      PAP (Historical)    NIL    HPV 16 DNA Negative Negative  Negative     HPV 18 DNA Negative Negative  Negative     Other HR HPV Negative Negative  Negative       Reviewed and updated as needed this visit by clinical staff   Tobacco  Allergies  Meds   Med Hx  Surg Hx  Fam Hx  Soc Hx        Reviewed and updated as needed this visit by Provider                  Past Medical History:   Diagnosis Date    History of colposcopy with cervical biopsy 2011    TERESE 1-2    S/P LEEP of cervix 2011    Mild-Moderate Dysplasia      Past Surgical History:   Procedure Laterality Date    BIOPSY BREAST Left     COLPOSCOPY CERVIX, LOOP ELECTRODE BIOPSY, COMBINED  2011    mild and moderate dysplasia    MAMMOPLASTY AUGMENTATION           OB History    Para Term  AB Living   0 0 0 0 0 0   SAB IAB Ectopic Multiple Live Births   0 0 0 0 0       ROS:  CONSTITUTIONAL: NEGATIVE for fever, chills, change in weight  INTEGUMENTARU/SKIN: NEGATIVE for worrisome rashes, moles or lesions  EYES: NEGATIVE for vision changes or irritation  ENT: NEGATIVE for ear, mouth and throat problems  RESP: NEGATIVE for significant cough or SOB  BREAST: NEGATIVE for masses, tenderness or discharge  CV: NEGATIVE for chest pain, palpitations or peripheral edema  GI: NEGATIVE for nausea, abdominal pain, heartburn, or change in bowel habits  : NEGATIVE for unusual urinary  "or vaginal symptoms. Periods are regular.  MUSCULOSKELETAL: NEGATIVE for significant arthralgias or myalgia  NEURO: NEGATIVE for weakness, dizziness or paresthesias  ENDOCRINE: NEGATIVE for temperature intolerance, skin/hair changes  HEME/ALLERGY/IMMUNE: NEGATIVE for bleeding problems  PSYCHIATRIC: NEGATIVE for changes in mood or affect    OBJECTIVE:   /80 (BP Location: Right arm, Patient Position: Chair, Cuff Size: Adult Regular)   Pulse 55   Temp 98.6  F (37  C) (Tympanic)   Resp 18   Ht 1.727 m (5' 8\")   Wt 64 kg (141 lb)   LMP 02/12/2025 (Approximate)   BMI 21.44 kg/m    EXAM:  GENERAL: alert and no distress  EYES: Eyes grossly normal to inspection, PERRL and conjunctivae and sclerae normal  HENT: ear canals and TM's normal, nose and mouth without ulcers or lesions  NECK: no adenopathy, no asymmetry, masses, or scars  RESP: lungs clear to auscultation - no rales, rhonchi or wheezes  BREAST: normal without masses, tenderness or nipple discharge and no palpable axillary masses or adenopathy  CV: regular rate and rhythm, normal S1 S2, no S3 or S4, no murmur, click or rub, no peripheral edema  ABDOMEN: soft, nontender, no hepatosplenomegaly, no masses and bowel sounds normal   (female) w/bimanual: normal female external genitalia, normal urethral meatus, normal vaginal mucosa, and normal cervix/adnexa/uterus without masses or discharge  MS: no gross musculoskeletal defects noted, no edema  SKIN: no suspicious lesions or rashes  NEURO: Normal strength and tone, mentation intact and speech normal  PSYCH: mentation appears normal, affect normal/bright      ASSESSMENT/PLAN:   (Z01.419) Women's annual routine gynecological examination  (primary encounter diagnosis)  Comment: no abnormal findings. Pap smear due in 2026. Menstrual cramps well controlled with over the counter treatment workability form given to patient regarding this.   Plan: CBC with platelets, Hemoglobin A1c,         Comprehensive metabolic " "panel (BMP + Alb, Alk         Phos, ALT, AST, Total. Bili, TP), TSH with free        T4 reflex: screening mammogram ordered.           (Z13.1) Screening for diabetes mellitus  Plan: Hemoglobin A1c, Comprehensive metabolic panel         (BMP + Alb, Alk Phos, ALT, AST, Total. Bili,         TP)          (Z13.29) Screening for thyroid disorder  Plan: TSH with free T4 reflex          (Z13.0) Screening, anemia, deficiency, iron  Plan: CBC with platelets          Patient has been advised of split billing requirements and indicates understanding: Yes  COUNSELING:   Reviewed preventive health counseling, as reflected in patient instructions       Regular exercise       Healthy diet/nutrition    Estimated body mass index is 21.44 kg/m  as calculated from the following:    Height as of this encounter: 1.727 m (5' 8\").    Weight as of this encounter: 64 kg (141 lb).        She reports that she has never smoked. She has never used smokeless tobacco.      Counseling Resources:  ATP IV Guidelines  Pooled Cohorts Equation Calculator  Breast Cancer Risk Calculator  BRCA-Related Cancer Risk Assessment: FHS-7 Tool  FRAX Risk Assessment  ICSI Preventive Guidelines  Dietary Guidelines for Americans, 2010  USDA's MyPlate  ASA Prophylaxis  Lung CA Screening    Kayleigh Orona PA-C  [facility] [place]    "

## 2025-02-26 NOTE — Clinical Note
2025    Ritika Soler   1985        To Whom it May Concern;    Please excuse Ritika Soler from work/school for a healthcare visit on 2025.    Sincerely,        Kayleigh Orona PA-C

## 2025-02-26 NOTE — NURSING NOTE
"Initial /80 (BP Location: Right arm, Patient Position: Chair, Cuff Size: Adult Regular)   Pulse 55   Temp 98.6  F (37  C) (Tympanic)   Resp 18   Ht 1.727 m (5' 8\")   Wt 64 kg (141 lb)   LMP 02/05/2025 (Approximate)   BMI 21.44 kg/m   Estimated body mass index is 21.44 kg/m  as calculated from the following:    Height as of this encounter: 1.727 m (5' 8\").    Weight as of this encounter: 64 kg (141 lb). .    "

## 2025-02-26 NOTE — Clinical Note
February 26, 2025      Ritika Soler  [address] [id number]              Dear Ritika,          Sincerely,      Kayleigh Orona {PROVIDER CREDENTIALS:[id number]}

## 2025-02-28 ENCOUNTER — ANCILLARY PROCEDURE (OUTPATIENT)
Dept: MAMMOGRAPHY | Facility: CLINIC | Age: 40
End: 2025-02-28
Attending: PHYSICIAN ASSISTANT
Payer: COMMERCIAL

## 2025-02-28 DIAGNOSIS — Z01.419 WOMEN'S ANNUAL ROUTINE GYNECOLOGICAL EXAMINATION: ICD-10-CM

## 2025-02-28 DIAGNOSIS — Z12.31 ENCOUNTER FOR SCREENING MAMMOGRAM FOR BREAST CANCER: ICD-10-CM

## 2025-02-28 PROCEDURE — 77063 BREAST TOMOSYNTHESIS BI: CPT

## 2025-03-04 ENCOUNTER — HOSPITAL ENCOUNTER (OUTPATIENT)
Dept: RESPIRATORY THERAPY | Facility: CLINIC | Age: 40
Discharge: HOME | End: 2025-03-04
Attending: INTERNAL MEDICINE
Payer: COMMERCIAL

## 2025-03-04 DIAGNOSIS — R53.82 CHRONIC FATIGUE: ICD-10-CM

## 2025-03-04 DIAGNOSIS — S06.9X0S TRAUMATIC BRAIN INJURY, WITHOUT LOSS OF CONSCIOUSNESS, SEQUELA: ICD-10-CM

## 2025-03-04 DIAGNOSIS — R41.840 ATTENTION AND CONCENTRATION DEFICIT: ICD-10-CM

## 2025-03-04 DIAGNOSIS — U09.9 POST COVID-19 CONDITION, UNSPECIFIED: ICD-10-CM

## 2025-03-04 DIAGNOSIS — Z77.120 CONTACT WITH OR EXPOSURE TO MOLD: ICD-10-CM

## 2025-03-04 DIAGNOSIS — J45.909 MILD ASTHMA: ICD-10-CM

## 2025-03-04 PROCEDURE — 94726 PLETHYSMOGRAPHY LUNG VOLUMES: CPT

## 2025-03-04 PROCEDURE — 94729 DIFFUSING CAPACITY: CPT

## 2025-03-04 PROCEDURE — 271N000002 HC RX 271

## 2025-03-04 PROCEDURE — 94060 EVALUATION OF WHEEZING: CPT

## 2025-03-04 RX ORDER — INHALER, ASSIST DEVICES
1 SPACER (EA) MISCELLANEOUS ONCE
Status: COMPLETED | OUTPATIENT
Start: 2025-03-04 | End: 2025-03-04

## 2025-03-04 RX ADMIN — Medication 1 EACH: at 15:36

## 2025-03-05 LAB
DLCOCOR-%PRED-PRE: 122 %
DLCOCOR-PRE: 28.9 ML/MIN/MMHG
DLCOUNC-%PRED-PRE: 117 %
DLCOUNC-PRE: 27.67 ML/MIN/MMHG
DLCOUNC-PRED: 23.56 ML/MIN/MMHG
ERV-%PRED-PRE: 78 %
ERV-PRE: 1.08 L
ERV-PRED: 1.38 L
EXPTIME-PRE: 6.57 SEC
FEF2575-%PRED-POST: 96 %
FEF2575-%PRED-PRE: 72 %
FEF2575-POST: 3.12 L/SEC
FEF2575-PRE: 2.35 L/SEC
FEF2575-PRED: 3.22 L/SEC
FEFMAX-%PRED-PRE: 83 %
FEFMAX-PRE: 6.36 L/SEC
FEFMAX-PRED: 7.58 L/SEC
FEV1-%PRED-PRE: 100 %
FEV1-PRE: 3.19 L
FEV1FEV6-PRE: 72 %
FEV1FEV6-PRED: 84 %
FEV1FVC-PRE: 71 %
FEV1FVC-PRED: 82 %
FEV1SVC-PRE: 73 %
FEV1SVC-PRED: 80 %
FIFMAX-PRE: 6.46 L/SEC
FRCPLETH-%PRED-PRE: 91 %
FRCPLETH-PRE: 2.89 L
FRCPLETH-PRED: 3.16 L
FVC-%PRED-PRE: 115 %
FVC-PRE: 4.47 L
FVC-PRED: 3.86 L
IC-%PRED-PRE: 119 %
IC-PRE: 3.3 L
IC-PRED: 2.76 L
RVPLETH-%PRED-PRE: 101 %
RVPLETH-PRE: 1.81 L
RVPLETH-PRED: 1.79 L
TLCPLETH-%PRED-PRE: 110 %
TLCPLETH-PRE: 6.19 L
TLCPLETH-PRED: 5.61 L
VA-%PRED-PRE: 106 %
VA-PRE: 5.95 L
VC-%PRED-PRE: 111 %
VC-PRE: 4.38 L
VC-PRED: 3.93 L